# Patient Record
Sex: MALE | Race: BLACK OR AFRICAN AMERICAN | NOT HISPANIC OR LATINO | Employment: UNEMPLOYED | URBAN - METROPOLITAN AREA
[De-identification: names, ages, dates, MRNs, and addresses within clinical notes are randomized per-mention and may not be internally consistent; named-entity substitution may affect disease eponyms.]

---

## 2023-10-05 ENCOUNTER — HOSPITAL ENCOUNTER (EMERGENCY)
Facility: CLINIC | Age: 48
Discharge: HOME | End: 2023-10-05
Attending: EMERGENCY MEDICINE | Admitting: EMERGENCY MEDICINE
Payer: MEDICAID

## 2023-10-05 ENCOUNTER — APPOINTMENT (OUTPATIENT)
Dept: GENERAL RADIOLOGY | Facility: CLINIC | Age: 48
End: 2023-10-05
Attending: EMERGENCY MEDICINE
Payer: MEDICAID

## 2023-10-05 VITALS
WEIGHT: 176 LBS | BODY MASS INDEX: 29.32 KG/M2 | HEART RATE: 62 BPM | HEIGHT: 65 IN | RESPIRATION RATE: 16 BRPM | OXYGEN SATURATION: 100 % | SYSTOLIC BLOOD PRESSURE: 133 MMHG | DIASTOLIC BLOOD PRESSURE: 85 MMHG | TEMPERATURE: 98.3 F

## 2023-10-05 DIAGNOSIS — S93.402A SPRAIN OF LEFT ANKLE, UNSPECIFIED LIGAMENT, INITIAL ENCOUNTER: ICD-10-CM

## 2023-10-05 PROCEDURE — 73030 X-RAY EXAM OF SHOULDER: CPT | Mod: RT

## 2023-10-05 PROCEDURE — 99284 EMERGENCY DEPT VISIT MOD MDM: CPT | Performed by: EMERGENCY MEDICINE

## 2023-10-05 PROCEDURE — 99284 EMERGENCY DEPT VISIT MOD MDM: CPT | Mod: 25 | Performed by: EMERGENCY MEDICINE

## 2023-10-05 PROCEDURE — 250N000013 HC RX MED GY IP 250 OP 250 PS 637: Performed by: EMERGENCY MEDICINE

## 2023-10-05 PROCEDURE — 29515 APPLICATION SHORT LEG SPLINT: CPT | Mod: LT | Performed by: EMERGENCY MEDICINE

## 2023-10-05 PROCEDURE — 73630 X-RAY EXAM OF FOOT: CPT | Mod: RT

## 2023-10-05 RX ORDER — ACETAMINOPHEN 500 MG
1000 TABLET ORAL ONCE
Status: COMPLETED | OUTPATIENT
Start: 2023-10-05 | End: 2023-10-05

## 2023-10-05 RX ORDER — IBUPROFEN 600 MG/1
600 TABLET, FILM COATED ORAL ONCE
Status: COMPLETED | OUTPATIENT
Start: 2023-10-05 | End: 2023-10-05

## 2023-10-05 RX ADMIN — IBUPROFEN 600 MG: 600 TABLET, FILM COATED ORAL at 12:07

## 2023-10-05 RX ADMIN — ACETAMINOPHEN 1000 MG: 500 TABLET ORAL at 12:07

## 2023-10-05 ASSESSMENT — ACTIVITIES OF DAILY LIVING (ADL): ADLS_ACUITY_SCORE: 35

## 2023-10-05 NOTE — DISCHARGE INSTRUCTIONS
I was happy to see that your x-rays did not demonstrate an obvious broken bone.  Your symptoms are most consistent with a sprain to the ligaments that support your ankles.  Typically pain will increase the first 12 to 24 hours as your body attempts to heal this area.  Pain should gradually subside over the course of 3 to 7 days.  You may bear weight as tolerated.  We have provided an ankle splint and crutches as needed.  I would plan to utilize acetaminophen and/or ibuprofen as needed.  The first 24 hours I would plan to utilize ice and elevation is much as possible to decrease the swelling.  Please follow-up with your primary care physician in 10 to 14 days should pain or instability persist.

## 2023-10-05 NOTE — ED TRIAGE NOTES
Patient presents due to 10/10 throbbing right ankle pain. Redness and swelling present to right ankle. Patient reports walking down stairs when he stepped wrong and rolled right ankle.     Triage Assessment       Row Name 10/05/23 1042       Triage Assessment (Adult)    Airway WDL WDL       Respiratory WDL    Respiratory WDL WDL       Skin Circulation/Temperature WDL    Skin Circulation/Temperature WDL X  swelling and redness right ankle       Peripheral/Neurovascular WDL    Peripheral Neurovascular WDL WDL       Cognitive/Neuro/Behavioral WDL    Cognitive/Neuro/Behavioral WDL WDL

## 2023-10-05 NOTE — ED PROVIDER NOTES
"  ED PROVIDER NOTE  October 5, 2023  History     Chief Complaint   Patient presents with    Ankle Pain     Patient presents due to 10/10 throbbing right ankle pain. Redness and swelling present to right ankle. Patient reports walking down stairs when he stepped wrong and rolled right ankle.     HPI  Jose Hutson is a 48 year old male who arrives today to the emergency department evaluation of right ankle and right shoulder pain.  The patient states he was going down the stairs when he stepped wrong rolling his right ankle.  His shoulder then struck the wall.  Patient states he has not been able to bear weight.  Patient reports pain predominantly in the dorsum of his foot near the proximal fourth and fifth metatarsal.  No distal change in strength or sensation.  No proximal injury to the ankle or knee.  He also does report some pain near the distal aspect of his clavicle on the right.  He is able to range his shoulder and reports no distal change in strength or sensation.  Pain minor at baseline exacerbated by movement.  No other injury sustained.      Past Medical History  History reviewed. No pertinent past medical history.  History reviewed. No pertinent surgical history.  No current outpatient medications on file.    No Known Allergies  Family History  History reviewed. No pertinent family history.  Social History   Social History     Tobacco Use    Smoking status: Every Day     Packs/day: 0.50     Types: Cigarettes    Smokeless tobacco: Never   Substance Use Topics    Alcohol use: Not Currently    Drug use: Not Currently         A medically appropriate review of systems was performed with pertinent positives and negatives noted in the HPI, and all other systems negative.      Physical Exam   BP: 133/85  Pulse: 62  Temp: 98.3  F (36.8  C)  Resp: 16  Height: 165.1 cm (5' 5\")  Weight: 79.8 kg (176 lb)  SpO2: 100 %      Physical Exam  Vitals and nursing note reviewed.   Constitutional:       Appearance: He is " not ill-appearing, toxic-appearing or diaphoretic.   HENT:      Head: Normocephalic and atraumatic.   Cardiovascular:      Pulses: Normal pulses.   Musculoskeletal:      Right shoulder: No swelling, deformity or crepitus. Normal range of motion. Normal strength. Normal pulse.      Right elbow: Normal range of motion. No tenderness.        Arms:       Right knee: No bony tenderness or crepitus. Normal range of motion. No tenderness.      Right ankle: No swelling, deformity or ecchymosis. Normal range of motion.      Right foot: Normal capillary refill. Tenderness present. No crepitus. Normal pulse.      Comments: Tenderness with palpation no step-off deformity or crepitus.   Neurological:      General: No focal deficit present.      Mental Status: He is alert and oriented to person, place, and time.      Cranial Nerves: No cranial nerve deficit.      Sensory: No sensory deficit.      Motor: No weakness.         ED Course        Procedures         Results for orders placed or performed during the hospital encounter of 10/05/23 (from the past 24 hour(s))   Foot  XR, G/E 3 views, right    Narrative    XR FOOT RIGHT G/E 3 VIEWS   10/5/2023 11:28 AM     HISTORY: Injury, pain  COMPARISON: None.       Impression    IMPRESSION: Normal joint spaces and alignment. There may be a small  bone fragment along the anterior process calcaneus which is probably  chronic. No acute appearing fracture.    AMY SHORT MD         SYSTEM ID:  [id number]   XR Shoulder Right G/E 3 Views    Narrative    XR SHOULDER RIGHT G/E 3 VIEWS   10/5/2023 11:28 AM     HISTORY: Injury, pain  COMPARISON: None.       Impression    IMPRESSION: Normal joint spaces and alignment. No fracture.    AMY SHORT MD         SYSTEM ID:  [id number]     Medications   acetaminophen (TYLENOL) tablet 1,000 mg (1,000 mg Oral $Given 10/5/23 1207)   ibuprofen (ADVIL/MOTRIN) tablet 600 mg (600 mg Oral $Given 10/5/23 1207)             Critical care was not  performed.     Medical Decision Making  The patient's presentation was of moderate complexity (an acute complicated injury).    The patient's evaluation involved:  history and exam without other MDM data elements      Assessments & Plan (with Medical Decision Making)     Jose Hutson is a 48 year old male who arrives today to the emergency department evaluation of right ankle and right shoulder pain.  On arrival, patient noted be alert, afebrile, hemodynamically stable.  No visible signs of external trauma of the head or neck.  Patient denies injury to the head.  No headache, change in vision or neurovascular deficit on examination.  Did not believe requires imaging of the head or neck.  Evaluation of shoulder demonstrates no gross deformity.  Range of motion intact.  Low suspicion for dislocation or fracture but does have some point tenderness of the distal clavicle.  No palpable off step or depression I would favor likely soft tissue injury but did offer x-ray imaging.  Evaluation of right lower extremity demonstrates no gross deformity.  Faint erythema overlying lateral aspect of right foot.  No gross deformity.  Distal neurovascular examination normal.  No pain with palpation of the distal fibula/tibia.  I would favor x-ray imaging to rule out fracture with less likely to be the case and suspect soft tissue injury.  X-ray imaging reveals no sign of obvious acute fracture.  Radiologic report reviewed.  Patient provided with air splint and crutches.  I discussed RICE as well as timing for follow-up with PCP in 10 to 14 days should pain or instability persist.  We are certainly happy to reevaluate this patient at any time should symptoms change, progress or worsen.    I have reviewed the nursing notes.    I have reviewed the findings, diagnosis, plan and need for follow up with the patient.    New Prescriptions    No medications on file       Final diagnoses:   Sprain of left ankle, unspecified ligament,  initial encounter       LIOR SHERWOOD MD    10/5/2023   [facility] EMERGENCY DEPARTMENT     Lior Sherwood MD  10/05/23 1249